# Patient Record
Sex: MALE | Race: WHITE | NOT HISPANIC OR LATINO | Employment: FULL TIME | URBAN - METROPOLITAN AREA
[De-identification: names, ages, dates, MRNs, and addresses within clinical notes are randomized per-mention and may not be internally consistent; named-entity substitution may affect disease eponyms.]

---

## 2024-01-22 ENCOUNTER — OFFICE VISIT (OUTPATIENT)
Dept: CARDIOLOGY | Facility: CLINIC | Age: 34
End: 2024-01-22
Payer: COMMERCIAL

## 2024-01-22 VITALS — WEIGHT: 206 LBS | HEART RATE: 85 BPM | DIASTOLIC BLOOD PRESSURE: 90 MMHG | SYSTOLIC BLOOD PRESSURE: 168 MMHG

## 2024-01-22 DIAGNOSIS — R07.89 ATYPICAL CHEST PAIN: ICD-10-CM

## 2024-01-22 DIAGNOSIS — I10 PRIMARY HYPERTENSION: Primary | ICD-10-CM

## 2024-01-22 DIAGNOSIS — R94.31 ABNORMAL EKG: ICD-10-CM

## 2024-01-22 DIAGNOSIS — I10 HYPERTENSION, UNSPECIFIED TYPE: ICD-10-CM

## 2024-01-22 PROCEDURE — 93000 ELECTROCARDIOGRAM COMPLETE: CPT | Performed by: INTERNAL MEDICINE

## 2024-01-22 PROCEDURE — 1036F TOBACCO NON-USER: CPT | Performed by: INTERNAL MEDICINE

## 2024-01-22 PROCEDURE — 3080F DIAST BP >= 90 MM HG: CPT | Performed by: INTERNAL MEDICINE

## 2024-01-22 PROCEDURE — 3077F SYST BP >= 140 MM HG: CPT | Performed by: INTERNAL MEDICINE

## 2024-01-22 PROCEDURE — 99204 OFFICE O/P NEW MOD 45 MIN: CPT | Performed by: INTERNAL MEDICINE

## 2024-01-22 RX ORDER — AMLODIPINE BESYLATE 5 MG/1
5 TABLET ORAL DAILY
Qty: 90 TABLET | Refills: 0 | Status: SHIPPED | OUTPATIENT
Start: 2024-01-22 | End: 2024-04-19 | Stop reason: SDUPTHER

## 2024-01-22 NOTE — PROGRESS NOTES
CARDIOLOGY NEW PATIENT OFFICE VISIT    Patient:  Parmjit Massey  YOB: 1990  MRN: [id number]       Chief Complaint:      Blood pressure is elevated.    History of Present Illness:     Parmjit Massey is a 33 y.o. male who is being seen today at the request of himself for evaluation of hypertension.  He does not have any history of atherosclerotic or valvular heart disease.  He notes a strong positive family history for coronary artery disease and that his father had an initial myocardial infarction in his 40s.  His paternal grandfather had coronary artery bypass grafting surgery in his early 60s and his maternal grandfather had coronary artery bypass grafting surgery in his 50s.  His family history is strongly positive for hypertension.  He believes his father developed hypertension in his late 30s.  Parmjit states that he generally has been healthy.  He denies any history of hypertension, diabetes mellitus, or hyperlipidemia.  He does not smoke cigarettes.  He works full-time as a .    He notes that over the past few years he has intermittently  check his blood pressure and noted elevated readings.  He has had several systolic readings in the 140s to 160s.  2 to 3 years ago he was working out regularly at the gym and weight 167 pounds.  He now weighs 206 pounds.  He notes that he lives alone and generally has not very compliant with a heart healthy diet.  He has been trying to limit his sodium intake to some extent.  He notes occasional aching pains in his upper chest.  He notes more of a soreness than pressure or tightness sensations.  These are not related to exertional activity.  He denies any shortness of breath.  He denies any orthopnea, PND, or increasing peripheral edema.  He denies any palpitations, lightheadedness, near-syncope, or syncope.  He denies any fever, chills, or cough.  He denies any nausea, vomiting, or diaphoresis.  He denies any hemoptysis, hematemesis, melena, or  hematochezia.      Allergies:     No Known Allergies       Outpatient Medications:     Current Outpatient Medications   Medication Instructions    amLODIPine (NORVASC) 5 mg, oral, Daily        Past Medical History:     No past medical history on file.    Social History:      Single.  Works as a .  He denies cigarette use.  He drinks on a social basis.    Family History:   No family history on file.    Review of Systems:     12 point review of systems completed and is negative other than that detailed above.       Objective:     Vitals:    01/22/24 1507   BP: 168/90   Pulse: 85       Wt Readings from Last 4 Encounters:   01/22/24 93.4 kg (206 lb)       Physical Examination:   GENERAL:  Well developed, well nourished, in no acute distress.  CHEST:  Symmetric and nontender.  NEURO/PSYCH:  Alert and oriented times three with approppriate behavior and responses.  NECK:  Supple, no JVD, no bruit.  LUNGS:  Clear to auscultation bilaterally, normal respiratory effort.  HEART:  Rate and rhythm regular with no evident murmur, no gallop appreciated.        There are no rubs, clicks or heaves.  EXTREMITIES:  Warm with good color, no clubbing or cyanosis.  There is no edema noted.  PERIPHERAL VASCULAR:  Pulses present and equally palpable; 2+ throughout.      Lab:     CBC:   Lab Results   Component Value Date    WBC 5.9 06/12/2023    RBC 4.93 06/12/2023    HGB 15.8 06/12/2023    HCT 47.0 06/12/2023     06/12/2023        CMP:    Lab Results   Component Value Date     06/12/2023    K 4.5 06/12/2023     06/12/2023    CO2 29 06/12/2023    BUN 19 06/12/2023    CREATININE 1.23 06/12/2023    GLUCOSE 119 (H) 06/12/2023    CALCIUM 8.9 06/12/2023       BMP:  Lab Results   Component Value Date     06/12/2023     10/17/2022    K 4.5 06/12/2023    K 4.3 10/17/2022     06/12/2023     10/17/2022    CO2 29 06/12/2023    CO2 27 10/17/2022    BUN 19 06/12/2023    BUN 17 10/17/2022    CREATININE  1.23 06/12/2023    CREATININE 1.13 10/17/2022       CBC:  Lab Results   Component Value Date    WBC 5.9 06/12/2023    WBC 7.0 10/17/2022    RBC 4.93 06/12/2023    RBC 4.90 10/17/2022    HGB 15.8 06/12/2023    HGB 15.5 10/17/2022    HCT 47.0 06/12/2023    HCT 45.8 10/17/2022    MCV 95 06/12/2023    MCV 93 10/17/2022    MCHC 33.6 06/12/2023    MCHC 33.8 10/17/2022    RDW 11.9 06/12/2023    RDW 12.3 10/17/2022     06/12/2023     10/17/2022       Hepatic Function Panel:    Lab Results   Component Value Date    ALKPHOS 36 06/12/2023    ALT 27 06/12/2023    AST 21 06/12/2023    PROT 6.7 06/12/2023    BILITOT 0.4 06/12/2023     EKG in the office shows normal sinus rhythm, small nondiagnostic inferior Q waves, nonspecific inferior ST-T wave changes.    Assessment:     Problem List Items Addressed This Visit             ICD-10-CM    Primary hypertension - Primary I10    Atypical chest pain R07.89    Abnormal EKG R94.31    Relevant Medications    amLODIPine (Norvasc) 5 mg tablet    Other Relevant Orders    CT cardiac scoring wo IV contrast    Vascular UsSRenal Artery Duplex Complete    CBC    Comprehensive Metabolic Panel    Lipid Panel    Thyroid Stimulating Hormone    Follow Up In Cardiology    Metanephrines Plasma    Aldosterone, Serum    Renin Activity     Other Visit Diagnoses         Codes    Hypertension, unspecified type     I10    Relevant Medications    amLODIPine (Norvasc) 5 mg tablet    Other Relevant Orders    CT cardiac scoring wo IV contrast    Vascular UsSRenal Artery Duplex Complete    CBC    Comprehensive Metabolic Panel    Lipid Panel    Thyroid Stimulating Hormone    Follow Up In Cardiology    Metanephrines Plasma    Aldosterone, Serum    Renin Activity            Plan:     -In light of multiple documented elevated blood pressure readings he will be started on amlodipine 5 mg daily.    -He was advised to restrict sodium with an aim of no more than 2 grams per day.     -He was advised to read  food labels for salt and saturated fat content in foods.    -He was advised on the need for regular exercise with an aim to walk at least 20 minutes on at least 5 days per week.    -We discussed the aim for a healthy weight.     -In light of new onset hypertension he will be scheduled for studies to exclude secondary causes.  Will obtain plasma renin, plasma aldosterone, plasma free metanephrines, and renal arterial ultrasound.    -In light of hypertension and strongly positive family history for coronary disease will be scheduled for a coronary CT calcium score for better long-term restratification.    -Further recommendations pending these studies.

## 2024-01-23 ENCOUNTER — LAB (OUTPATIENT)
Dept: LAB | Facility: LAB | Age: 34
End: 2024-01-23
Payer: COMMERCIAL

## 2024-01-23 DIAGNOSIS — R94.31 ABNORMAL EKG: ICD-10-CM

## 2024-01-23 DIAGNOSIS — I10 HYPERTENSION, UNSPECIFIED TYPE: ICD-10-CM

## 2024-01-23 LAB
ALBUMIN SERPL BCP-MCNC: 4.9 G/DL (ref 3.4–5)
ALP SERPL-CCNC: 41 U/L (ref 33–120)
ALT SERPL W P-5'-P-CCNC: 35 U/L (ref 10–52)
ANION GAP SERPL CALC-SCNC: 16 MMOL/L (ref 10–20)
AST SERPL W P-5'-P-CCNC: 26 U/L (ref 9–39)
BILIRUB SERPL-MCNC: 0.9 MG/DL (ref 0–1.2)
BUN SERPL-MCNC: 16 MG/DL (ref 6–23)
CALCIUM SERPL-MCNC: 9.7 MG/DL (ref 8.6–10.3)
CHLORIDE SERPL-SCNC: 100 MMOL/L (ref 98–107)
CHOLEST SERPL-MCNC: 243 MG/DL (ref 0–199)
CHOLESTEROL/HDL RATIO: 3.7
CO2 SERPL-SCNC: 27 MMOL/L (ref 21–32)
CREAT SERPL-MCNC: 1.11 MG/DL (ref 0.5–1.3)
EGFRCR SERPLBLD CKD-EPI 2021: 90 ML/MIN/1.73M*2
ERYTHROCYTE [DISTWIDTH] IN BLOOD BY AUTOMATED COUNT: 12.1 % (ref 11.5–14.5)
GLUCOSE SERPL-MCNC: 83 MG/DL (ref 74–99)
HCT VFR BLD AUTO: 46 % (ref 41–52)
HDLC SERPL-MCNC: 65.8 MG/DL
HGB BLD-MCNC: 15.8 G/DL (ref 13.5–17.5)
LDLC SERPL CALC-MCNC: 153 MG/DL
MCH RBC QN AUTO: 32.6 PG (ref 26–34)
MCHC RBC AUTO-ENTMCNC: 34.3 G/DL (ref 32–36)
MCV RBC AUTO: 95 FL (ref 80–100)
NON HDL CHOLESTEROL: 177 MG/DL (ref 0–149)
NRBC BLD-RTO: 0 /100 WBCS (ref 0–0)
PLATELET # BLD AUTO: 250 X10*3/UL (ref 150–450)
POTASSIUM SERPL-SCNC: 4.3 MMOL/L (ref 3.5–5.3)
PROT SERPL-MCNC: 7.4 G/DL (ref 6.4–8.2)
RBC # BLD AUTO: 4.84 X10*6/UL (ref 4.5–5.9)
SODIUM SERPL-SCNC: 139 MMOL/L (ref 136–145)
TRIGL SERPL-MCNC: 121 MG/DL (ref 0–149)
TSH SERPL-ACNC: 1.12 MIU/L (ref 0.44–3.98)
VLDL: 24 MG/DL (ref 0–40)
WBC # BLD AUTO: 6.2 X10*3/UL (ref 4.4–11.3)

## 2024-01-23 PROCEDURE — 36415 COLL VENOUS BLD VENIPUNCTURE: CPT

## 2024-01-23 PROCEDURE — 80061 LIPID PANEL: CPT

## 2024-01-23 PROCEDURE — 85027 COMPLETE CBC AUTOMATED: CPT

## 2024-01-23 PROCEDURE — 83835 ASSAY OF METANEPHRINES: CPT

## 2024-01-23 PROCEDURE — 80053 COMPREHEN METABOLIC PANEL: CPT

## 2024-01-23 PROCEDURE — 84244 ASSAY OF RENIN: CPT

## 2024-01-23 PROCEDURE — 84443 ASSAY THYROID STIM HORMONE: CPT

## 2024-01-23 PROCEDURE — 82088 ASSAY OF ALDOSTERONE: CPT

## 2024-01-25 ENCOUNTER — TELEPHONE (OUTPATIENT)
Dept: CARDIOLOGY | Facility: CLINIC | Age: 34
End: 2024-01-25
Payer: COMMERCIAL

## 2024-01-25 LAB — ALDOST SERPL-MCNC: 5.5 NG/DL

## 2024-01-25 NOTE — TELEPHONE ENCOUNTER
----- Message from Asim Davis MD sent at 1/24/2024  4:26 PM EST -----  I sent a message earlier but got a notification that it might not of gone through.  Labs reviewed and are all normal with exception of elevated cholesterol of 243.  Will await other testing prior to making further recommended .  Continue same and follow-up as scheduled.  Thanks.  ----- Message -----  From: Lab, Background User  Sent: 1/23/2024   8:45 PM EST  To: Asim Davis MD

## 2024-01-25 NOTE — TELEPHONE ENCOUNTER
Pt called the office and advised of message. Verbalized good understanding.     Pending appt 2/23/24 with Dr. Davis

## 2024-01-25 NOTE — TELEPHONE ENCOUNTER
1/25/24  Called patient who was not available.  Left voice mail instructing patient to call the office.  Vani Blevins, CMA

## 2024-01-26 LAB
ANNOTATION COMMENT IMP: NORMAL
METANEPHS SERPL-SCNC: 0.2 NMOL/L (ref 0–0.49)
NORMETANEPH FREE SERPL-SCNC: 0.32 NMOL/L (ref 0–0.89)
RENIN PLAS-CCNC: 0.9 NG/ML/HR

## 2024-01-30 ENCOUNTER — TELEPHONE (OUTPATIENT)
Dept: CARDIOLOGY | Facility: CLINIC | Age: 34
End: 2024-01-30
Payer: COMMERCIAL

## 2024-01-30 NOTE — TELEPHONE ENCOUNTER
1/30/24  Patient returned call to office and notified regarding Dr. Davis's response.  He verbalized understanding.  Vani Blevins, CMA

## 2024-01-30 NOTE — TELEPHONE ENCOUNTER
----- Message from Asim Davis MD sent at 1/30/2024  1:52 PM EST -----  Labs reviewed and are normal except for modestly elevated cholesterol.  Can discuss in detail at his upcoming office visit.

## 2024-02-16 ENCOUNTER — TELEPHONE (OUTPATIENT)
Dept: CARDIOLOGY | Facility: CLINIC | Age: 34
End: 2024-02-16

## 2024-02-16 ENCOUNTER — ANCILLARY PROCEDURE (OUTPATIENT)
Dept: CARDIOLOGY | Facility: CLINIC | Age: 34
End: 2024-02-16
Payer: COMMERCIAL

## 2024-02-16 ENCOUNTER — HOSPITAL ENCOUNTER (OUTPATIENT)
Dept: RADIOLOGY | Facility: CLINIC | Age: 34
Discharge: HOME | End: 2024-02-16
Payer: COMMERCIAL

## 2024-02-16 DIAGNOSIS — R94.31 ABNORMAL EKG: ICD-10-CM

## 2024-02-16 DIAGNOSIS — I10 HYPERTENSION, UNSPECIFIED TYPE: ICD-10-CM

## 2024-02-16 PROCEDURE — 75571 CT HRT W/O DYE W/CA TEST: CPT

## 2024-02-16 PROCEDURE — 93975 VASCULAR STUDY: CPT | Performed by: INTERNAL MEDICINE

## 2024-02-16 PROCEDURE — 93975 VASCULAR STUDY: CPT

## 2024-02-16 NOTE — TELEPHONE ENCOUNTER
----- Message from Asim Davis MD sent at 2/16/2024  4:11 PM EST -----  Coronary CT calcium score reviewed and is 0.  This is very good news.  No evidence of any atherosclerotic plaque in the heart arteries.  Continue same and follow-up as scheduled.  Thanks.

## 2024-02-16 NOTE — TELEPHONE ENCOUNTER
----- Message from Asim Davis MD sent at 2/16/2024  4:13 PM EST -----  Renal arterial ultrasound reviewed and shows normal blood flow to the left renal artery.  There is suggestion of some moderate narrowing of the artery going to the right renal artery.  This is unlikely to be clinically significant and cause a rise in blood pressure.  Will continue same and follow-up as scheduled.  Thanks.

## 2024-02-23 ENCOUNTER — OFFICE VISIT (OUTPATIENT)
Dept: CARDIOLOGY | Facility: CLINIC | Age: 34
End: 2024-02-23
Payer: COMMERCIAL

## 2024-02-23 VITALS — HEART RATE: 81 BPM | DIASTOLIC BLOOD PRESSURE: 88 MMHG | WEIGHT: 201 LBS | SYSTOLIC BLOOD PRESSURE: 138 MMHG

## 2024-02-23 DIAGNOSIS — I10 HYPERTENSION, UNSPECIFIED TYPE: ICD-10-CM

## 2024-02-23 DIAGNOSIS — E78.2 MIXED HYPERLIPIDEMIA: ICD-10-CM

## 2024-02-23 DIAGNOSIS — I10 PRIMARY HYPERTENSION: Primary | ICD-10-CM

## 2024-02-23 DIAGNOSIS — R94.31 ABNORMAL EKG: ICD-10-CM

## 2024-02-23 PROCEDURE — 1036F TOBACCO NON-USER: CPT | Performed by: INTERNAL MEDICINE

## 2024-02-23 PROCEDURE — 3079F DIAST BP 80-89 MM HG: CPT | Performed by: INTERNAL MEDICINE

## 2024-02-23 PROCEDURE — 99214 OFFICE O/P EST MOD 30 MIN: CPT | Performed by: INTERNAL MEDICINE

## 2024-02-23 PROCEDURE — 3075F SYST BP GE 130 - 139MM HG: CPT | Performed by: INTERNAL MEDICINE

## 2024-04-19 DIAGNOSIS — R94.31 ABNORMAL EKG: ICD-10-CM

## 2024-04-19 DIAGNOSIS — I10 HYPERTENSION, UNSPECIFIED TYPE: ICD-10-CM

## 2024-04-19 RX ORDER — AMLODIPINE BESYLATE 5 MG/1
5 TABLET ORAL DAILY
Qty: 90 TABLET | Refills: 3 | Status: SHIPPED | OUTPATIENT
Start: 2024-04-19 | End: 2025-04-19

## 2025-02-28 ENCOUNTER — APPOINTMENT (OUTPATIENT)
Dept: CARDIOLOGY | Facility: CLINIC | Age: 35
End: 2025-02-28
Payer: COMMERCIAL

## 2025-05-30 DIAGNOSIS — R94.31 ABNORMAL EKG: ICD-10-CM

## 2025-05-30 DIAGNOSIS — I10 HYPERTENSION, UNSPECIFIED TYPE: ICD-10-CM

## 2025-05-30 RX ORDER — AMLODIPINE BESYLATE 5 MG/1
5 TABLET ORAL DAILY
Qty: 1 TABLET | Refills: 0 | OUTPATIENT
Start: 2025-05-30

## 2025-05-30 NOTE — TELEPHONE ENCOUNTER
Rec'd rx refill request from [facility].  Last script #14 sent on 5/20/25. Routed to [facility]. Pt has enough pills to get to appt 6/3/25.

## 2025-06-03 ENCOUNTER — APPOINTMENT (OUTPATIENT)
Dept: CARDIOLOGY | Facility: CLINIC | Age: 35
End: 2025-06-03
Payer: COMMERCIAL

## 2025-06-03 VITALS
HEIGHT: 66 IN | HEART RATE: 98 BPM | SYSTOLIC BLOOD PRESSURE: 152 MMHG | BODY MASS INDEX: 35.52 KG/M2 | WEIGHT: 221 LBS | DIASTOLIC BLOOD PRESSURE: 94 MMHG

## 2025-06-03 DIAGNOSIS — I10 PRIMARY HYPERTENSION: Primary | ICD-10-CM

## 2025-06-03 DIAGNOSIS — E78.2 MIXED HYPERLIPIDEMIA: ICD-10-CM

## 2025-06-03 DIAGNOSIS — I10 HYPERTENSION, UNSPECIFIED TYPE: ICD-10-CM

## 2025-06-03 DIAGNOSIS — R94.31 ABNORMAL EKG: ICD-10-CM

## 2025-06-03 PROCEDURE — 3008F BODY MASS INDEX DOCD: CPT | Performed by: NURSE PRACTITIONER

## 2025-06-03 PROCEDURE — 3077F SYST BP >= 140 MM HG: CPT | Performed by: NURSE PRACTITIONER

## 2025-06-03 PROCEDURE — 3080F DIAST BP >= 90 MM HG: CPT | Performed by: NURSE PRACTITIONER

## 2025-06-03 PROCEDURE — 99215 OFFICE O/P EST HI 40 MIN: CPT | Performed by: NURSE PRACTITIONER

## 2025-06-03 RX ORDER — FAMOTIDINE 40 MG/1
40 TABLET, FILM COATED ORAL 2 TIMES DAILY
Qty: 30 TABLET | Refills: 1 | Status: SHIPPED | OUTPATIENT
Start: 2025-06-03 | End: 2026-06-03

## 2025-06-03 RX ORDER — AMLODIPINE BESYLATE 5 MG/1
7.5 TABLET ORAL DAILY
Qty: 45 TABLET | Refills: 6 | Status: SHIPPED | OUTPATIENT
Start: 2025-06-03

## 2025-06-04 NOTE — PROGRESS NOTES
Chief Complaint:  Chief Complaint   Patient presents with    Follow-up     One year follow up        Parmjit Massey Jr. is a 34 y.o. male that presents to the office today for cardiac follow-up. He  follows with his  primary cardiologist Dr. Davis  and was added to my schedule today.      Per Dr. Davis office notes,  He does not have any history of atherosclerotic or valvular heart disease.  He has a stronglypositive family history for coronary artery disease.  His father had an initial myocardial infarction in his 40s.  His paternal grandfather had coronary artery bypass grafting surgery in his early 60s and his maternal grandfather had coronary artery bypass grafting surgery in his 50s.  His family history is strongly positive for hypertension.  He believes his father developed hypertension in his late 30s.  Parmjit states that he generally has been healthy.  He denies any history of hypertension, diabetes mellitus, or hyperlipidemia.  He does not smoke cigarettes.  He works full-time as a .     He notes that over the past few years he has intermittently checked his blood pressure and noted elevated readings.  He has had several systolic readings in the 140s to 160s.  Two to three years ago he was working out regularly at the gym and weight 167 pounds.  He now weighs 206 pounds.  He lives alone and generally has not very compliant with a heart healthy diet.  He has been trying to limit his sodium intake to some extent.  At the time of his visit January 22, 2024 he described occasional aching pains in his upper chest.  This was more of a soreness than pressure or tightness sensations.  The symptoms were not related to exertional activity.       Renal arterial ultrasound on February 16, 2024 showed normal flow in the left renal artery and approximately 60% stenosis in the right renal artery.  Lab studies dated January 23, 2024 showed a normal CBC and CMP.  TSH was normal.  Cholesterol 243 with HDL 66, ,  triglycerides 121.  Aldosterone and renin activity levels were normal.  Plasma metanephrines were normal.  Coronary CT calcium score on October 16, 2024 was 0.  Extracardiac structures were normal.     Overall he states that he is doing well from a cardiac standpoint.  He denies any chest pain, chest pressure, chest tightness, shortness of breath, palpitations, lightheadedness or dizziness.  He does note of the last few months he has been experiencing acid reflux and heartburn stating that after he eats he has to sit up approximately 4 hours to prevent this from occurring.  He also notes he has been experiencing what he believes is anxiety lately that is associated with fast heart rates.    Testing Reviewed  CBC:   Lab Results   Component Value Date    WBC 6.2 01/23/2024    RBC 4.84 01/23/2024    HGB 15.8 01/23/2024    HCT 46.0 01/23/2024     01/23/2024        CMP:    Lab Results   Component Value Date     01/23/2024    K 4.3 01/23/2024     01/23/2024    CO2 27 01/23/2024    BUN 16 01/23/2024    CREATININE 1.11 01/23/2024    GLUCOSE 83 01/23/2024    CALCIUM 9.7 01/23/2024       Lipid Profile:    Lab Results   Component Value Date    TRIG 121 01/23/2024    HDL 65.8 01/23/2024    LDLCALC 153 (H) 01/23/2024       BMP:  Lab Results   Component Value Date     01/23/2024     06/12/2023     10/17/2022    K 4.3 01/23/2024    K 4.5 06/12/2023    K 4.3 10/17/2022     01/23/2024     06/12/2023     10/17/2022    CO2 27 01/23/2024    CO2 29 06/12/2023    CO2 27 10/17/2022    BUN 16 01/23/2024    BUN 19 06/12/2023    BUN 17 10/17/2022    CREATININE 1.11 01/23/2024    CREATININE 1.23 06/12/2023    CREATININE 1.13 10/17/2022       CBC:  Lab Results   Component Value Date    WBC 6.2 01/23/2024    WBC 5.9 06/12/2023    WBC 7.0 10/17/2022    RBC 4.84 01/23/2024    RBC 4.93 06/12/2023    RBC 4.90 10/17/2022    HGB 15.8 01/23/2024    HGB 15.8 06/12/2023    HGB 15.5 10/17/2022    HCT  "46.0 01/23/2024    HCT 47.0 06/12/2023    HCT 45.8 10/17/2022    MCV 95 01/23/2024    MCV 95 06/12/2023    MCV 93 10/17/2022    MCH 32.6 01/23/2024    MCHC 34.3 01/23/2024    MCHC 33.6 06/12/2023    MCHC 33.8 10/17/2022    RDW 12.1 01/23/2024    RDW 11.9 06/12/2023    RDW 12.3 10/17/2022     01/23/2024     06/12/2023     10/17/2022       Hepatic Function Panel:    Lab Results   Component Value Date    ALKPHOS 41 01/23/2024    ALT 35 01/23/2024    AST 26 01/23/2024    PROT 7.4 01/23/2024    BILITOT 0.9 01/23/2024     TSH:    Lab Results   Component Value Date    TSH 1.12 01/23/2024       Problem List[1]    Social History[2]    Medical History[3]    Current Medications[4]    Patient has no known allergies.    Family History[5]    Surgical History[6]       Review of systems  Constitutional: No weight loss, fever, chills, weakness or fatigue  HEENT: No visual loss, blurred vision, double vision or yellow sclerae  Skin: No rash or itching  Cardiovascular: No chest pain, pressure or discomfort, edema.  Positive for palpitations  Respiratory: No shortness of breath, cough or sputum  Gastrointestinal: Positive for acid reflux.  No nausea, vomiting or diarrhea. No bloody or dark tarry stools.  Neurological: No headache, lightheadedness, dizziness, syncope.   Musculoskeletal: No muscle, back pain, joint pain or stiffness.  Hematologic: No anemia, bleeding or bruising.    BP (!) 152/94 (BP Location: Left arm, Patient Position: Sitting)   Pulse 98   Ht 1.676 m (5' 6\")   Wt 100 kg (221 lb)   BMI 35.67 kg/m²     Physical Exam  Constitutional: Well developed, awake/alert x 3, no distress.  Head/Neck: No JVD, No bruits  Respiratory/Thorax: patent airways, CTAB, normal breath sounds with good expansion.  Cardiovascular: Regular rate and rhythm, no murmurs, normal S1 and S2,   Gastrointestinal: Non distended, soft, non-tender, no rebound tenderness or guarding.  Extremities: No cyanosis, edema. "   Neurological: Alert and oriented x 3. Moves extremities spontaneous with purpose.  Psychological: Appropriate mood and behavior  Skin: Warm and Dry. No lesions or rashes.     Assessment/Plan  Hypertension; suboptimal control  Increase amlodipine 5 mg 1 1/2 tablets daily to total 7.5 mg  Referral to GI secondary to acid reflux  Will provide short-term prescription for Pepcid 40 mg 1 tablet daily  Anxiety; reports with elevated heart rates.  I have recommended we obtain an event monitor to ensure he is not having atrial/ventricular arrhythmias which are causing his elevated heart rate and anxiety.  He declines at this time but is agreeable to think about it  Obtain labs including CMP, CBC, magnesium level, lipid panel  Further recommendations pending labs  Follow with Dr. Davis 6 months or sooner if needed.         Please excuse any errors in grammar or translation related to dictation, voice recognition software was used to prepare this document.         [1]   Patient Active Problem List  Diagnosis    Primary hypertension    Atypical chest pain    Abnormal EKG    Mixed hyperlipidemia   [2]   Social History  Tobacco Use    Smoking status: Never     Passive exposure: Never    Smokeless tobacco: Never   [3] No past medical history on file.  [4]   Current Outpatient Medications:     amLODIPine (Norvasc) 5 mg tablet, Take 1.5 tablets (7.5 mg) by mouth once daily., Disp: 45 tablet, Rfl: 6    famotidine (Pepcid) 40 mg tablet, Take 1 tablet (40 mg) by mouth 2 times a day., Disp: 30 tablet, Rfl: 1  [5] No family history on file.  [6] No past surgical history on file.

## 2025-12-03 ENCOUNTER — APPOINTMENT (OUTPATIENT)
Dept: CARDIOLOGY | Facility: CLINIC | Age: 35
End: 2025-12-03
Payer: COMMERCIAL